# Patient Record
Sex: MALE | Race: WHITE | NOT HISPANIC OR LATINO | Employment: OTHER | URBAN - METROPOLITAN AREA
[De-identification: names, ages, dates, MRNs, and addresses within clinical notes are randomized per-mention and may not be internally consistent; named-entity substitution may affect disease eponyms.]

---

## 2022-08-11 ENCOUNTER — TELEPHONE (OUTPATIENT)
Dept: GASTROENTEROLOGY | Facility: CLINIC | Age: 72
End: 2022-08-11
Payer: MEDICARE

## 2022-08-11 NOTE — TELEPHONE ENCOUNTER
I spoke with the patient who states that he is not currently having any issues but he needs to schedule his E/C and needs to see Dr. Rizzo in clinic first. Requesting OV before December.  JÚNIOR EDGE

## 2022-08-11 NOTE — TELEPHONE ENCOUNTER
----- Message from Yvonne Siddiqi sent at 8/11/2022  1:55 PM CDT -----  Regarding: appt access  Contact: Nurys Cavanaugh/rick  Type:  Sooner Apoointment Request    Caller is requesting a sooner appointment.  Caller declined first available appointment listed below.  Caller will not accept being placed on the waitlist and is requesting a message be sent to doctor.  Name of Caller: Nurys Cavanaugh/friend   When is the first available appointment? 12/27/22  Symptoms: 6 mo fu  Would the patient rather a call back or a response via [facility]?  Call back   Best Call Back Number: [phone]  Additional Information:  Pt will not be in the country in December and is wanting to be seen sooner

## 2022-08-13 NOTE — TELEPHONE ENCOUNTER
He does need an office visit to update his chart and can be scheduled for his upper and lower endoscopies from there.  Schedule office visit with [facility], next available.  DIALLO

## 2022-11-08 ENCOUNTER — TELEPHONE (OUTPATIENT)
Dept: GASTROENTEROLOGY | Facility: CLINIC | Age: 72
End: 2022-11-08

## 2022-11-08 DIAGNOSIS — K74.60 HEPATIC CIRRHOSIS, UNSPECIFIED HEPATIC CIRRHOSIS TYPE, UNSPECIFIED WHETHER ASCITES PRESENT: Primary | ICD-10-CM

## 2022-11-08 NOTE — TELEPHONE ENCOUNTER
Notify patient that I got his orthopedics doctor's request for clearance for his surgery. I can provide that after his 12/27/2022 OV with me (no OV this year so far). Order cirrhosis blood work and US for early part of 12/2022. So we have the results for that OV.  NBP

## 2022-11-09 NOTE — TELEPHONE ENCOUNTER
Pt states that he has been trying for quite some time now to be seen earlier than end of Dec. States he will be out of the country from Dec 15-Mid Jan. Please advise.

## 2022-11-16 PROBLEM — B18.2 CHRONIC HEPATITIS C WITHOUT HEPATIC COMA: Status: ACTIVE | Noted: 2022-11-16

## 2022-11-16 PROBLEM — K74.60 HEPATIC CIRRHOSIS: Status: ACTIVE | Noted: 2022-11-16

## 2022-11-20 ENCOUNTER — TELEPHONE (OUTPATIENT)
Dept: GASTROENTEROLOGY | Facility: CLINIC | Age: 72
End: 2022-11-20
Payer: MEDICARE

## 2022-11-20 NOTE — TELEPHONE ENCOUNTER
Abd US: cirrhotic liver, onl.   Notify patient that his US of the liver shows his cirrhosis changes but otherwise normal. Have his blood drawn before his next OV with me.  DIALLO

## 2023-01-25 ENCOUNTER — OFFICE VISIT (OUTPATIENT)
Dept: GASTROENTEROLOGY | Facility: CLINIC | Age: 73
End: 2023-01-25
Payer: MEDICARE

## 2023-01-25 VITALS
OXYGEN SATURATION: 95 % | DIASTOLIC BLOOD PRESSURE: 78 MMHG | HEART RATE: 77 BPM | HEIGHT: 72 IN | WEIGHT: 225.81 LBS | BODY MASS INDEX: 30.59 KG/M2 | SYSTOLIC BLOOD PRESSURE: 151 MMHG

## 2023-01-25 DIAGNOSIS — K74.60 HEPATIC CIRRHOSIS, UNSPECIFIED HEPATIC CIRRHOSIS TYPE, UNSPECIFIED WHETHER ASCITES PRESENT: Primary | ICD-10-CM

## 2023-01-25 DIAGNOSIS — Z86.19 HISTORY OF HEPATITIS C: ICD-10-CM

## 2023-01-25 DIAGNOSIS — Z86.010 HISTORY OF COLON POLYPS: ICD-10-CM

## 2023-01-25 DIAGNOSIS — Z87.11 HISTORY OF PEPTIC ULCER DISEASE: ICD-10-CM

## 2023-01-25 PROCEDURE — 99214 OFFICE O/P EST MOD 30 MIN: CPT | Mod: S$GLB,,, | Performed by: INTERNAL MEDICINE

## 2023-01-25 PROCEDURE — 99214 PR OFFICE/OUTPT VISIT, EST, LEVL IV, 30-39 MIN: ICD-10-PCS | Mod: S$GLB,,, | Performed by: INTERNAL MEDICINE

## 2023-01-25 RX ORDER — LOSARTAN POTASSIUM 50 MG/1
50 TABLET ORAL DAILY
COMMUNITY

## 2023-01-25 NOTE — PROGRESS NOTES
Clinic Note    Reason for visit:  The primary encounter diagnosis was Hepatic cirrhosis, unspecified hepatic cirrhosis type, unspecified whether ascites present. Diagnoses of History of hepatitis C, History of colon polyps, and History of peptic ulcer disease were also pertinent to this visit.    PCP: No primary care provider on file.       HPI:  This is a 73 y.o. male who is established. Patient with cirrhosis and a h/o Hep C s/p Mavyret x 12 weeks in 10/2018.      He is having left knee replacement next week.  His daughter moved to [place].  He spent 8 months of last year in [place].  This is his normal.  He will notify me when he is back in the country and for what period of time when he is aware that information.  He knows very well how much acetaminophen he is allowed to take daily particularly after his knee replacement.  He is aware to include the acetaminophen that is in his pain medication as well with this calculation.  He does not use alcohol.  Very infrequently he may have a unit or 2.  He had blood work for his preoperative evaluation.  He had his ultrasound that showed a nodular liver but no liver tumors.  I will give him blood work orders for when he gets blood work next.      No PHCP.    Abd US 11/2022: cirrhotic liver, onl.     EGD 5/6/2021: GBx nl w/o Hp    EGD/Colonoscopy 9/11/2019: 4 TA (one 10mm), repeat colon in 3y.     Abnormality of alphafetoprotein: CT without hepatomas  Admits alcohol use: stopped  Chronic hepatitis C: VL 1.4, genotype 1a, completed Mavyret x12 weeks 7/24/2018, SVR 12 achieved, 2/2020 HCV VL ND, Tx naive beforehand  Gastric ulcer: he will get it from [place], taking PRN based on Sx panto 20mg PO dailynbsp; 8/24/2017  Personal history of colon polyps: due 2022  Unspecified cirrhosis of liver: signs of coagulopathy/thrombocytopenia, nodular liver on US and CT, no liver tumors on CT, AFP mildly elevated, no edema/ascites, no EV on 8/2017 EGD, 7/2018 EGD with small EV --> 9/2019  w/o varices. Blood/US every 6 months. OV every year but he is to call me if any issues.  Screening for malignant neoplasms of colon: high risk  INR raised: minimal, due to cirrhosis    Review of Systems   Constitutional:  Negative for chills, diaphoresis, fatigue, fever and unexpected weight change.   HENT:  Negative for hearing loss, mouth sores, nosebleeds, postnasal drip, sore throat, trouble swallowing and voice change.    Eyes:  Negative for pain, discharge and eye dryness.   Respiratory:  Negative for apnea, cough, choking, chest tightness, shortness of breath and wheezing.    Cardiovascular:  Negative for chest pain, palpitations, leg swelling and claudication.   Gastrointestinal:  Negative for abdominal distention, abdominal pain, anal bleeding, blood in stool, change in bowel habit, constipation, diarrhea, nausea, rectal pain, vomiting, reflux, fecal incontinence and change in bowel habit.   Genitourinary:  Negative for bladder incontinence, difficulty urinating, dysuria, flank pain, frequency and hematuria.   Musculoskeletal:  Negative for arthralgias, back pain, joint swelling and joint deformity.   Integumentary:  Negative for color change, rash and wound.   Allergic/Immunologic: Negative for environmental allergies and food allergies.   Neurological:  Negative for seizures, facial asymmetry, speech difficulty, weakness, headaches and memory loss.   Hematological:  Negative for adenopathy. Does not bruise/bleed easily.   Psychiatric/Behavioral:  Negative for agitation, behavioral problems, confusion, hallucinations and sleep disturbance.       Past Medical History:   Diagnosis Date    Colon polyp     Essential (primary) hypertension     Unspecified cirrhosis of liver     Unspecified osteoarthritis, unspecified site      Past Surgical History:   Procedure Laterality Date    CATARACT EXTRACTION Bilateral     COLONOSCOPY       Family History   Problem Relation Age of Onset    Lung cancer Mother     Lung  cancer Father     Diabetes Maternal Grandmother      Social History     Tobacco Use    Smoking status: Former     Types: Cigarettes    Smokeless tobacco: Never   Substance Use Topics    Alcohol use: Not Currently    Drug use: Never     Review of patient's allergies indicates:  No Known Allergies     Medication List with Changes/Refills   Current Medications    LOSARTAN (COZAAR) 50 MG TABLET    Take 50 mg by mouth once daily.          Vital Signs:  BP (!) 151/78   Pulse 77   Ht 6' (1.829 m)   Wt 102.4 kg (225 lb 12.8 oz)   SpO2 95%   BMI 30.62 kg/m²         Physical Exam  Constitutional:       General: He is not in acute distress.     Appearance: Normal appearance. He is well-developed. He is not ill-appearing or toxic-appearing.   HENT:      Head: Normocephalic and atraumatic.      Nose: Nose normal.      Mouth/Throat:      Mouth: Mucous membranes are moist.      Pharynx: Oropharynx is clear. No oropharyngeal exudate or posterior oropharyngeal erythema.   Eyes:      General: Lids are normal. No scleral icterus.        Right eye: No discharge.         Left eye: No discharge.      Extraocular Movements: Extraocular movements intact.      Conjunctiva/sclera: Conjunctivae normal.   Cardiovascular:      Rate and Rhythm: Normal rate and regular rhythm.      Pulses:           Radial pulses are 2+ on the right side and 2+ on the left side.   Pulmonary:      Effort: Pulmonary effort is normal. No respiratory distress.      Breath sounds: No stridor. No wheezing or rhonchi.   Abdominal:      General: Bowel sounds are normal. There is no distension.      Palpations: Abdomen is soft. There is no fluid wave, hepatomegaly, splenomegaly or mass.      Tenderness: There is no abdominal tenderness. There is no guarding or rebound.   Musculoskeletal:      Cervical back: Full passive range of motion without pain.      Right lower leg: No edema.      Left lower leg: No edema.   Lymphadenopathy:      Cervical: No cervical  adenopathy.   Skin:     General: Skin is warm and dry.      Capillary Refill: Capillary refill takes less than 2 seconds.      Coloration: Skin is not cyanotic, jaundiced or pale.      Findings: No rash.   Neurological:      General: No focal deficit present.      Mental Status: He is alert and oriented to person, place, and time.   Psychiatric:         Mood and Affect: Mood normal.         Behavior: Behavior is cooperative.          All of the data above and below has been reviewed by myself and any further interpretations will be reflected in the assessment and plan.   The data includes review of external notes, and independent interpretation of lab results, procedures, x-rays, and imaging reports.      Assessment:  Hepatic cirrhosis, unspecified hepatic cirrhosis type, unspecified whether ascites present    History of hepatitis C    History of colon polyps    History of peptic ulcer disease    We will plan on scheduling his repeat colonoscopy at his next office visit.  He will notify me when he is returning back to the country and for how long or when he is leaving the country.  If he does not leave until November 2023 feel we can complete his follow-up visit and his colonoscopy before he leaves.  He will notify me sooner if any issues.     Recommendations:  Complete my blood work orders non urgently.  Notify me sooner if any issues.  Limit all acetaminophen intake to 2000 mg or less spread out throughout each day.    Risks, benefits, and alternatives of medical management, any associated procedures, and/or treatment discussed with the patient. Patient given opportunity to ask questions and voices understanding. Patient has elected to proceed with the recommended care modalities as discussed.    Follow up in about 6 months (around 7/25/2023).    Order summary:  No orders of the defined types were placed in this encounter.       Instructed patient to notify my office if they have not been contacted within two  weeks after any procedures, submitting any samples (biopsies, blood, stool, urine, etc.) or after any imaging (X-ray, CT, MRI, etc.).     Charisse Rizzo MD    This document may have been created using a voice recognition transcribing system. Incorrect words or phrases may have been missed during proofreading. Please interpret accordingly or contact me for clarification.

## 2023-01-25 NOTE — LETTER
January 25, 2023        Joo Wood MD  [address]  [address]  [facility]  [place] [address]             [facility]  [address] [place] [address]  Phone: [phone]  Fax: [phone]   Patient: Maciej Spence   MR Number: [id number]   YOB: 1950   Date of Visit: 1/25/2023       Dear Dr. Wood:    Thank you for referring Maciej Spence to me for evaluation. Attached you will find relevant portions of my assessment and plan of care.    If you have questions, please do not hesitate to call me. I look forward to following Maciej Spence along with you.    Sincerely,      Charisse Rizzo MD            CC  No Recipients    Enclosure

## 2023-01-25 NOTE — PATIENT INSTRUCTIONS
Complete my blood work orders non urgently.  Notify me sooner if any issues.  Limit all acetaminophen intake to 2000 mg or less spread out throughout each day.

## 2023-01-26 DIAGNOSIS — K74.60 HEPATIC CIRRHOSIS, UNSPECIFIED HEPATIC CIRRHOSIS TYPE, UNSPECIFIED WHETHER ASCITES PRESENT: ICD-10-CM

## 2023-01-26 DIAGNOSIS — Z86.010 HISTORY OF COLON POLYPS: Primary | ICD-10-CM

## 2023-01-26 DIAGNOSIS — Z86.19 HISTORY OF HEPATITIS C: ICD-10-CM

## 2023-01-26 LAB
ABS NRBC COUNT: 0 X 10 3/UL (ref 0–0.01)
ABSOLUTE BASOPHIL: 0.03 X 10 3/UL (ref 0–0.22)
ABSOLUTE EOSINOPHIL: 0.12 X 10 3/UL (ref 0.04–0.54)
ABSOLUTE IMMATURE GRAN: 0.02 X 10 3/UL (ref 0–0.04)
ABSOLUTE LYMPHOCYTE: 1.58 X 10 3/UL (ref 0.86–4.75)
ABSOLUTE MONOCYTE: 0.43 X 10 3/UL (ref 0.22–1.08)
ALBUMIN SERPL-MCNC: 4.6 G/DL (ref 3.5–5.2)
ALBUMIN/GLOB SERPL ELPH: 1.6 {RATIO} (ref 1–2.7)
ALP ISOS SERPL LEV INH-CCNC: 60 U/L (ref 40–130)
ALPHA FETOPROTEIN MATERNAL: 3.7 NG/ML (ref 0–8.3)
ALT (SGPT): 19 U/L (ref 0–41)
ANION GAP SERPL CALC-SCNC: 10 MMOL/L (ref 8–17)
AST SERPL-CCNC: 20 U/L (ref 0–40)
BASOPHILS NFR BLD: 0.6 % (ref 0.2–1.2)
BILIRUBIN, TOTAL: 0.75 MG/DL (ref 0–1.2)
BUN/CREAT SERPL: 24.2 (ref 6–20)
CALCIUM SERPL-MCNC: 10.1 MG/DL (ref 8.6–10.2)
CARBON DIOXIDE, CO2: 25 MMOL/L (ref 22–29)
CHLORIDE: 103 MMOL/L (ref 98–107)
CREAT SERPL-MCNC: 0.76 MG/DL (ref 0.7–1.2)
EOSINOPHIL NFR BLD: 2.5 % (ref 0.7–7)
GFR ESTIMATION: 95.5
GLOBULIN: 2.9 G/DL (ref 1.5–4.5)
GLUCOSE: 103 MG/DL (ref 82–115)
HCT VFR BLD AUTO: 41.6 % (ref 42–52)
HGB BLD-MCNC: 14.3 G/DL (ref 14–18)
IMMATURE GRANULOCYTES: 0.4 % (ref 0–0.5)
INR PPP: 0.97 (ref 0.88–1.12)
LYMPHOCYTES NFR BLD: 32.3 % (ref 19.3–53.1)
MCH RBC QN AUTO: 30.8 PG (ref 27–32)
MCHC RBC AUTO-ENTMCNC: 34.4 G/DL (ref 32–36)
MCV RBC AUTO: 89.7 FL (ref 80–94)
MONOCYTES NFR BLD: 8.8 % (ref 4.7–12.5)
NEUTROPHILS # BLD AUTO: 2.71 X 10 3/UL (ref 2.15–7.56)
NEUTROPHILS NFR BLD: 55.4 % (ref 34–71.1)
NUCLEATED RED BLOOD CELLS: 0 /100 WBC (ref 0–0.2)
PLATELET # BLD AUTO: 194 X 10 3/UL (ref 135–400)
POTASSIUM: 4.8 MMOL/L (ref 3.5–5.1)
PROT SNV-MCNC: 7.5 G/DL (ref 6.4–8.3)
PROTIME: 10.4 SECONDS (ref 9.5–11.9)
RBC # BLD AUTO: 4.64 X 10 6/UL (ref 4.7–6.1)
RDW-SD: 42.6 FL (ref 37–54)
SODIUM: 138 MMOL/L (ref 136–145)
UREA NITROGEN (BUN): 18.4 MG/DL (ref 8–23)
WBC # BLD: 4.89 X 10 3/UL (ref 4.3–10.8)

## 2023-01-26 NOTE — TELEPHONE ENCOUNTER
I was under the impression that the patient already received surgical clearance by provider that orthopedic set him up with.  I contacted Bri.  She explained that any time the patient is seeing a specialist request clearance from each specialist as well.  She confirm that the patient had a CBC, CMP, PT, PTT and vitamin-D.  She will fax those results to me.  [phone] was a number provided.  Send those to me in nicko urgent for me to compose a clearance letter for the patient.  DIALLO

## 2023-01-26 NOTE — TELEPHONE ENCOUNTER
----- Message from Shelly Valenzuela LPN sent at 1/26/2023  1:00 PM CST -----  Do you provide surgical clearance?    ----- Message -----  From: Lisa Matthews  Sent: 1/26/2023   8:56 AM CST  To: [facility] Staff    Type:  Needs Medical Advice    Who Called:Bri red/Dr Wade office  Symptoms (please be specific): -   How long has patient had these symptoms:  -  Pharmacy name and phone #:  -  Would the patient rather a call back or a response via [facility]?    Best Call Back Number: [phone]  Additional Information: Need surgery clearance ( pt is scheduled for procedure on 1/31/23) please fax to [phone] Attn: Bri

## 2023-01-27 NOTE — TELEPHONE ENCOUNTER
Letter composed for GI surgical clearance. Send letter with last OV note, last labs and last US report.  Notify patient that his blood work from yesterday looked well. Notify him that you have send the above to Dr. Brennan's office. He will need repeat cirrhosis labs and liver US in 6 months.  DIALLO

## 2023-07-03 VITALS — BODY MASS INDEX: 29.12 KG/M2 | HEIGHT: 72 IN | WEIGHT: 215 LBS

## 2023-07-03 NOTE — TELEPHONE ENCOUNTER
I contacted pt to notify him that it its time for labs and U/S. Pt requested that I direct schedule him for his colonoscopy that was past due. He stated that he had left total knee replacement done 6 months ago and is now ready to proceed with procedure. Will be out of country for work beginning in November and he requested a date in August.  Chart updated and pt scheduled for 8/24/23.

## 2023-07-06 RX ORDER — SOD SULF/POT CHLORIDE/MAG SULF 1.479 G
12 TABLET ORAL DAILY
Qty: 24 TABLET | Refills: 0 | Status: SHIPPED | OUTPATIENT
Start: 2023-07-06 | End: 2024-03-19

## 2023-07-10 ENCOUNTER — TELEPHONE (OUTPATIENT)
Dept: GASTROENTEROLOGY | Facility: CLINIC | Age: 73
End: 2023-07-10
Payer: MEDICARE

## 2023-07-10 LAB
ABS NRBC COUNT: 0 X 10 3/UL (ref 0–0.01)
ABSOLUTE BASOPHIL: 0.02 X 10 3/UL (ref 0–0.22)
ABSOLUTE EOSINOPHIL: 0.14 X 10 3/UL (ref 0.04–0.54)
ABSOLUTE IMMATURE GRAN: 0.02 X 10 3/UL (ref 0–0.04)
ABSOLUTE LYMPHOCYTE: 1.68 X 10 3/UL (ref 0.86–4.75)
ABSOLUTE MONOCYTE: 0.45 X 10 3/UL (ref 0.22–1.08)
ALBUMIN SERPL-MCNC: 4.8 G/DL (ref 3.5–5.2)
ALBUMIN/GLOB SERPL ELPH: 1.7 {RATIO} (ref 1–2.7)
ALP ISOS SERPL LEV INH-CCNC: 68 U/L (ref 40–130)
ALPHA FETOPROTEIN MATERNAL: 2.93 NG/ML (ref 0–8.3)
ALT (SGPT): 16 U/L (ref 0–41)
ANION GAP SERPL CALC-SCNC: 14 MMOL/L (ref 8–17)
AST SERPL-CCNC: 20 U/L (ref 0–40)
BASOPHILS NFR BLD: 0.4 % (ref 0.2–1.2)
BILIRUBIN, TOTAL: 0.64 MG/DL (ref 0–1.2)
BUN/CREAT SERPL: 23.9 (ref 6–20)
CALCIUM SERPL-MCNC: 10.1 MG/DL (ref 8.6–10.2)
CARBON DIOXIDE, CO2: 25 MMOL/L (ref 22–29)
CHLORIDE: 106 MMOL/L (ref 98–107)
CREAT SERPL-MCNC: 0.74 MG/DL (ref 0.7–1.2)
EOSINOPHIL NFR BLD: 2.5 % (ref 0.7–7)
GFR ESTIMATION: 96.27 ML/MIN/1.73M2
GLOBULIN: 2.9 G/DL (ref 1.5–4.5)
GLUCOSE: 111 MG/DL (ref 82–115)
HCT VFR BLD AUTO: 43.4 % (ref 42–52)
HGB BLD-MCNC: 14.6 G/DL (ref 14–18)
IMMATURE GRANULOCYTES: 0.4 % (ref 0–0.5)
INR PPP: 0.97 (ref 0.88–1.12)
LYMPHOCYTES NFR BLD: 30.2 % (ref 19.3–53.1)
MCH RBC QN AUTO: 30.4 PG (ref 27–32)
MCHC RBC AUTO-ENTMCNC: 33.6 G/DL (ref 32–36)
MCV RBC AUTO: 90.2 FL (ref 80–94)
MONOCYTES NFR BLD: 8.1 % (ref 4.7–12.5)
NEUTROPHILS # BLD AUTO: 3.26 X 10 3/UL (ref 2.15–7.56)
NEUTROPHILS NFR BLD: 58.4 % (ref 34–71.1)
NUCLEATED RED BLOOD CELLS: 0 /100 WBC (ref 0–0.2)
PLATELET # BLD AUTO: 215 X 10 3/UL (ref 135–400)
POTASSIUM: 5.2 MMOL/L (ref 3.5–5.1)
PROT SNV-MCNC: 7.7 G/DL (ref 6.4–8.3)
PROTIME: 10.1 SECONDS (ref 9.3–11.5)
RBC # BLD AUTO: 4.81 X 10 6/UL (ref 4.7–6.1)
RDW-SD: 42.7 FL (ref 37–54)
SODIUM: 145 MMOL/L (ref 136–145)
UREA NITROGEN (BUN): 17.7 MG/DL (ref 8–23)
WBC # BLD: 5.57 X 10 3/UL (ref 4.3–10.8)

## 2023-07-31 ENCOUNTER — TELEPHONE (OUTPATIENT)
Dept: GASTROENTEROLOGY | Facility: CLINIC | Age: 73
End: 2023-07-31
Payer: MEDICARE

## 2023-07-31 NOTE — TELEPHONE ENCOUNTER
----- Message from Leonor Mcpherson sent at 7/31/2023  9:50 AM CDT -----  Contact: self  Pt is calling about an appt on tmrw. But there is no record of that on his chart. Pt has received a text for liver scan possibly.  Please call pt asap at [phone]

## 2023-08-18 ENCOUNTER — TELEPHONE (OUTPATIENT)
Dept: GASTROENTEROLOGY | Facility: CLINIC | Age: 73
End: 2023-08-18
Payer: MEDICARE

## 2023-08-18 VITALS — BODY MASS INDEX: 29.12 KG/M2 | WEIGHT: 215 LBS | HEIGHT: 72 IN

## 2023-08-18 DIAGNOSIS — Z86.010 HISTORY OF COLON POLYPS: Primary | ICD-10-CM

## 2023-08-18 NOTE — TELEPHONE ENCOUNTER
"[facility]  [address] [place] [address]  Phone: [phone]  Fax: [phone]    History & Physical         Provider: Dr. Charisse Rizzo    Patient Name: Maciej Spence DOB (age):1950  73 y.o.           Gender: male   Phone: [phone]     Referring Physician: No primary care provider on file.     Vital Signs:   Height - 6' 0"  Weight - 215 lb  BMI -  29.16    Plan: Colonoscopy @ [facility]    Encounter Diagnosis   Name Primary?    History of colon polyps Yes           History:      Past Medical History:   Diagnosis Date    BMI 29.0-29.9,adult     Colon polyp     Essential (primary) hypertension     History of hepatitis C 2018    Unspecified cirrhosis of liver     Unspecified osteoarthritis, unspecified site       Past Surgical History:   Procedure Laterality Date    CATARACT EXTRACTION Bilateral     COLONOSCOPY      TOTAL KNEE ARTHROPLASTY Left       Medication List with Changes/Refills   Current Medications    LOSARTAN (COZAAR) 50 MG TABLET    Take 50 mg by mouth once daily.    SOD SULF-POT CHLORIDE-MAG SULF (SUTAB) 1.479-0.188- 0.225 GRAM TABLET    Take 12 tablets by mouth once daily. Take according to package instructions with indicated amount of water. No breakfast day before test. May substitute with Suprep, Clenpiq, Plenvu, Moviprep or GoLytely based on Rx plan and patient preference.      Review of patient's allergies indicates:  No Known Allergies   Family History   Problem Relation Age of Onset    Lung cancer Mother     Lung cancer Father     Diabetes Maternal Grandmother       Social History     Tobacco Use    Smoking status: Former     Current packs/day: 0.00     Types: Cigarettes    Smokeless tobacco: Never   Substance Use Topics    Alcohol use: Not Currently    Drug use: Yes     Types: Marijuana        Physical Examination:     General Appearance:___________________________  HEENT: " _____________________________________  Abdomen:____________________________________  Heart:________________________________________  Lungs:_______________________________________  Extremities:___________________________________  Skin:_________________________________________  Endocrine:____________________________________  Genitourinary:_________________________________  Neurological:__________________________________      Patient has been evaluated immediately prior to sedation and is medically cleared for endoscopy with IVCS as an ASA class: ______      Physician Signature: _________________________       Date: ________  Time: ________

## 2023-08-24 ENCOUNTER — OUTSIDE PLACE OF SERVICE (OUTPATIENT)
Dept: GASTROENTEROLOGY | Facility: CLINIC | Age: 73
End: 2023-08-24
Payer: MEDICARE

## 2023-08-24 LAB — CRC RECOMMENDATION EXT: NORMAL

## 2023-08-24 PROCEDURE — 45385 COLONOSCOPY W/LESION REMOVAL: CPT | Mod: PT,,, | Performed by: INTERNAL MEDICINE

## 2023-08-24 PROCEDURE — 45385 PR COLONOSCOPY,REMV LESN,SNARE: ICD-10-PCS | Mod: PT,,, | Performed by: INTERNAL MEDICINE

## 2023-08-24 PROCEDURE — 45380 COLONOSCOPY AND BIOPSY: CPT | Mod: PT,59,, | Performed by: INTERNAL MEDICINE

## 2023-08-24 PROCEDURE — 45380 PR COLONOSCOPY,BIOPSY: ICD-10-PCS | Mod: PT,59,, | Performed by: INTERNAL MEDICINE

## 2023-08-29 ENCOUNTER — TELEPHONE (OUTPATIENT)
Dept: GASTROENTEROLOGY | Facility: CLINIC | Age: 73
End: 2023-08-29

## 2023-08-29 NOTE — TELEPHONE ENCOUNTER
5 TA, repeat colonoscopy with adult colonoscope in 3 years.     Notify patient. Update in Health Maintenance section of Epic.  NBP

## 2023-08-29 NOTE — TELEPHONE ENCOUNTER
Pt given results and verbalized understanding.  Pt recently had US and results notes said it would be discussed at upcoming OV. Pt had OV for 8/29/23 that he rescheduled. Next OV isnt until March 2024.

## 2023-08-30 NOTE — TELEPHONE ENCOUNTER
Notify patient that there were no liver tumors on his liver US. Repeat cirrhosis blood work and US before next OV.  NBP

## 2023-10-02 ENCOUNTER — DOCUMENTATION ONLY (OUTPATIENT)
Dept: GASTROENTEROLOGY | Facility: CLINIC | Age: 73
End: 2023-10-02
Payer: MEDICARE

## 2023-12-20 ENCOUNTER — TELEPHONE (OUTPATIENT)
Dept: GASTROENTEROLOGY | Facility: CLINIC | Age: 73
End: 2023-12-20
Payer: MEDICARE

## 2024-03-19 ENCOUNTER — OFFICE VISIT (OUTPATIENT)
Dept: GASTROENTEROLOGY | Facility: CLINIC | Age: 74
End: 2024-03-19
Payer: MEDICARE

## 2024-03-19 VITALS
HEART RATE: 77 BPM | DIASTOLIC BLOOD PRESSURE: 89 MMHG | BODY MASS INDEX: 28.76 KG/M2 | SYSTOLIC BLOOD PRESSURE: 148 MMHG | HEIGHT: 72 IN | OXYGEN SATURATION: 99 % | WEIGHT: 212.38 LBS

## 2024-03-19 DIAGNOSIS — Z86.19 HISTORY OF HEPATITIS C: ICD-10-CM

## 2024-03-19 DIAGNOSIS — Z87.11 HISTORY OF PEPTIC ULCER DISEASE: ICD-10-CM

## 2024-03-19 DIAGNOSIS — Z87.19 HISTORY OF ESOPHAGEAL VARICES: ICD-10-CM

## 2024-03-19 DIAGNOSIS — Z86.010 HISTORY OF COLON POLYPS: ICD-10-CM

## 2024-03-19 DIAGNOSIS — K74.60 HEPATIC CIRRHOSIS, UNSPECIFIED HEPATIC CIRRHOSIS TYPE, UNSPECIFIED WHETHER ASCITES PRESENT: Primary | ICD-10-CM

## 2024-03-19 PROCEDURE — 99215 OFFICE O/P EST HI 40 MIN: CPT | Mod: S$GLB,,, | Performed by: INTERNAL MEDICINE

## 2024-03-19 NOTE — PROGRESS NOTES
Clinic Note    Reason for visit:  The primary encounter diagnosis was Hepatic cirrhosis, unspecified hepatic cirrhosis type, unspecified whether ascites present. Diagnoses of History of esophageal varices, History of hepatitis C, History of peptic ulcer disease, and History of colon polyps were also pertinent to this visit.    PCP: Rosalinda, Primary Doctor       HPI:  This is a 74 y.o. male who is here for a follow up. Patient with cirrhosis. Patient denies any reflux, dysphagia, abdominal pain, constipation, diarrhea, or blood in stool. Denies ETOH use. May drink 1 glass of wine at [place].    7/2023: K5.2, CBC/AFP/CMP/INR nl.     8/2023 Abd US: coarse liver, nodular, mild GB wall thickening, perhaps mild sludge in GB, no hepatomas, PV patent.     Colonoscopy 8/24/2023: Long redundant colon. 5 TA, repeat colonoscopy with adult colonoscope in 3 years.   EGD 5/6/2021: GBx nl w/o Hp     Abnormality of alphafetoprotein: CT without hepatomas  Admits alcohol use: stopped  Chronic hepatitis C: VL 1.4, genotype 1a, completed Mavyret x12 weeks 7/24/2018, SVR 12 achieved, 2/2020 HCV VL ND, Tx naive beforehand  Gastric ulcer: he will get it from [place], taking PRN based on Sx panto 20mg PO daily  Unspecified cirrhosis of liver: signs of coagulopathy/thrombocytopenia, nodular liver on US and CT, no liver tumors on CT, AFP mildly elevated, no edema/ascites, no EV on 8/2017 EGD, 7/2018 EGD with small EV --> 9/2019 w/o varices. Blood/US every 6 months. OV every year but he is to call me if any issues.  Screening for malignant neoplasms of colon: high risk  INR raised: minimal, due to cirrhosis    Review of Systems   Constitutional:  Negative for diaphoresis, fatigue, fever and unexpected weight change.   HENT:  Negative for hearing loss, mouth sores, postnasal drip, sore throat and trouble swallowing.    Eyes:  Negative for pain, discharge and eye dryness.   Respiratory:  Negative for apnea, cough, choking, chest tightness, shortness  of breath and wheezing.    Cardiovascular:  Negative for chest pain, palpitations and leg swelling.   Gastrointestinal:  Negative for abdominal distention, abdominal pain, anal bleeding, blood in stool, change in bowel habit, constipation, diarrhea, nausea, rectal pain, vomiting, reflux and fecal incontinence.   Genitourinary:  Negative for bladder incontinence, dysuria and hematuria.   Musculoskeletal:  Negative for arthralgias, back pain and joint swelling.   Integumentary:  Negative for color change and rash.   Allergic/Immunologic: Negative for environmental allergies and food allergies.   Neurological:  Negative for seizures and headaches.   Hematological:  Negative for adenopathy. Does not bruise/bleed easily.        Past Medical History:   Diagnosis Date    BMI 29.0-29.9,adult     Colon polyp     Essential (primary) hypertension     History of hepatitis C 2018    Unspecified cirrhosis of liver     Unspecified osteoarthritis, unspecified site      Past Surgical History:   Procedure Laterality Date    CATARACT EXTRACTION Bilateral     COLONOSCOPY      TOTAL KNEE ARTHROPLASTY Left      Family History   Problem Relation Age of Onset    Lung cancer Mother     Lung cancer Father     Diabetes Maternal Grandmother     Esophageal cancer Neg Hx     Inflammatory bowel disease Neg Hx     Irritable bowel syndrome Neg Hx     Rectal cancer Neg Hx     Stomach cancer Neg Hx     Ulcerative colitis Neg Hx      Social History     Tobacco Use    Smoking status: Former     Types: Cigarettes    Smokeless tobacco: Never   Substance Use Topics    Alcohol use: Yes     Comment: very rare    Drug use: Yes     Types: Marijuana     Review of patient's allergies indicates:  No Known Allergies     Medication List with Changes/Refills   Current Medications    LOSARTAN (COZAAR) 50 MG TABLET    Take 50 mg by mouth once daily.   Discontinued Medications    SOD SULF-POT CHLORIDE-MAG SULF (SUTAB) 1.479-0.188- 0.225 GRAM TABLET    Take 12 tablets  by mouth once daily. Take according to package instructions with indicated amount of water. No breakfast day before test. May substitute with Suprep, Clenpiq, Plenvu, Moviprep or GoLytely based on Rx plan and patient preference.         Vital Signs:  BP (!) 148/89 (BP Location: Left arm, Patient Position: Sitting)   Pulse 77   Ht 6' (1.829 m)   Wt 96.3 kg (212 lb 6.4 oz)   SpO2 99%   BMI 28.81 kg/m²         Physical Exam  Constitutional:       General: He is not in acute distress.     Appearance: Normal appearance. He is well-developed. He is not ill-appearing or toxic-appearing.   HENT:      Head: Normocephalic and atraumatic.      Nose: Nose normal.      Mouth/Throat:      Mouth: Mucous membranes are moist.      Pharynx: Oropharynx is clear. No oropharyngeal exudate or posterior oropharyngeal erythema.   Eyes:      General: Lids are normal. No scleral icterus.        Right eye: No discharge.         Left eye: No discharge.      Conjunctiva/sclera: Conjunctivae normal.   Cardiovascular:      Rate and Rhythm: Normal rate and regular rhythm.      Pulses:           Radial pulses are 2+ on the right side and 2+ on the left side.   Pulmonary:      Effort: Pulmonary effort is normal. No respiratory distress.      Breath sounds: No stridor. No wheezing.   Abdominal:      General: Bowel sounds are normal. There is no distension.      Palpations: Abdomen is soft. There is no fluid wave, hepatomegaly, splenomegaly or mass.      Tenderness: There is no abdominal tenderness. There is no guarding or rebound.   Musculoskeletal:      Cervical back: Full passive range of motion without pain.   Lymphadenopathy:      Cervical: No cervical adenopathy.   Skin:     General: Skin is warm and dry.      Capillary Refill: Capillary refill takes less than 2 seconds.      Coloration: Skin is not cyanotic, jaundiced or pale.   Neurological:      General: No focal deficit present.      Mental Status: He is alert and oriented to person,  place, and time.   Psychiatric:         Mood and Affect: Mood normal.         Behavior: Behavior is cooperative.            All of the data above and below has been reviewed by myself and any further interpretations will be reflected in the assessment and plan.   The data includes review of external notes, and independent interpretation of lab results, procedures, x-rays, and imaging reports.      Assessment:  Hepatic cirrhosis, unspecified hepatic cirrhosis type, unspecified whether ascites present  -     CBC Auto Differential; Future; Expected date: 03/19/2024  -     Comprehensive Metabolic Panel; Future; Expected date: 03/19/2024  -     Protime-INR; Future; Expected date: 03/19/2024  -     AFP Tumor Marker; Future; Expected date: 03/19/2024  -     US Abdomen Complete; Future; Expected date: 03/19/2024  -     Ambulatory Referral to External Surgery    History of esophageal varices  -     Ambulatory Referral to External Surgery    History of hepatitis C    History of peptic ulcer disease  -     Ambulatory Referral to External Surgery    History of colon polyps    Colon w/adult colonoscope due 8/2026.  Chronic hepatitis C: VL 1.4, genotype 1a, completed Mavyret x12 weeks 7/24/2018, SVR 12 achieved, 2/2020 HCV VL ND, Tx naive beforehand    Due for EGD -varices surveillance.     Recommendations:    Schedule upper endoscopy.  Schedule abdominal ultrasound.  Submit bloodwork.       Risks, benefits, and alternatives of medical management, any associated procedures, and/or treatment discussed with the patient. Patient given opportunity to ask questions and voices understanding. Patient has elected to proceed with the recommended care modalities as discussed.    Instructed patient to notify my office if they have not been contacted within two weeks after any procedures, submitting any samples (biopsies, blood, stool, urine, etc.) or after any imaging (X-ray, CT, MRI, etc.).     Follow up in about 1 year (around 3/19/2025).  W/NBP, 6m w/TAMIR.    Order summary:  Orders Placed This Encounter   Procedures    US Abdomen Complete    CBC Auto Differential    Comprehensive Metabolic Panel    Protime-INR    AFP Tumor Marker    Ambulatory Referral to External Surgery      This assessment, plan, and documentation was performed in collaboration with Catarina Díaz NP.     This document may have been created using a voice recognition transcribing system. Incorrect words or phrases may have been missed during proofreading. Please interpret accordingly or contact me for clarification.     Charisse Rizzo MD

## 2024-03-19 NOTE — PATIENT INSTRUCTIONS
Schedule upper endoscopy.  Schedule abdominal ultrasound.  Submit bloodwork.     Please notify my office if you have not been contacted within two weeks after any procedures, submitting any samples (biopsies, blood, stool, urine, etc.) or after any imaging (X-ray, CT, MRI, etc.).

## 2024-03-26 LAB
ABS NRBC COUNT: 0 X 10 3/UL (ref 0–0.01)
ABSOLUTE BASOPHIL: 0.03 X 10 3/UL (ref 0–0.22)
ABSOLUTE EOSINOPHIL: 0.13 X 10 3/UL (ref 0.04–0.54)
ABSOLUTE IMMATURE GRAN: 0.01 X 10 3/UL (ref 0–0.04)
ABSOLUTE LYMPHOCYTE: 1.98 X 10 3/UL (ref 0.86–4.75)
ABSOLUTE MONOCYTE: 0.5 X 10 3/UL (ref 0.22–1.08)
ALBUMIN SERPL-MCNC: 4.4 G/DL (ref 3.5–5.2)
ALBUMIN/GLOB SERPL ELPH: 1.4 {RATIO} (ref 1–2.7)
ALP ISOS SERPL LEV INH-CCNC: 69 U/L (ref 40–130)
ALPHA FETOPROTEIN MATERNAL: 3.14 NG/ML (ref 0–8.3)
ALT (SGPT): 20 U/L (ref 0–41)
ANION GAP SERPL CALC-SCNC: 10 MMOL/L (ref 8–17)
AST SERPL-CCNC: 20 U/L (ref 0–40)
BASOPHILS NFR BLD: 0.5 % (ref 0.2–1.2)
BILIRUBIN, TOTAL: 0.64 MG/DL (ref 0–1.2)
BUN/CREAT SERPL: 26.4 (ref 6–20)
CALCIUM SERPL-MCNC: 9.9 MG/DL (ref 8.6–10.2)
CARBON DIOXIDE, CO2: 24 MMOL/L (ref 22–29)
CHLORIDE: 105 MMOL/L (ref 98–107)
CREAT SERPL-MCNC: 0.75 MG/DL (ref 0.7–1.2)
EOSINOPHIL NFR BLD: 2 % (ref 0.7–7)
GFR ESTIMATION: 95.29 ML/MIN/1.73M2
GLOBULIN: 3.1 G/DL (ref 1.5–4.5)
GLUCOSE: 100 MG/DL (ref 82–115)
HCT VFR BLD AUTO: 41.4 % (ref 42–52)
HGB BLD-MCNC: 13.7 G/DL (ref 14–18)
IMMATURE GRANULOCYTES: 0.2 % (ref 0–0.5)
INR PPP: 0.97 (ref 0.88–1.12)
LYMPHOCYTES NFR BLD: 31.2 % (ref 19.3–53.1)
MCH RBC QN AUTO: 30.1 PG (ref 27–32)
MCHC RBC AUTO-ENTMCNC: 33.1 G/DL (ref 32–36)
MCV RBC AUTO: 91 FL (ref 80–94)
MONOCYTES NFR BLD: 7.9 % (ref 4.7–12.5)
NEUTROPHILS # BLD AUTO: 3.7 X 10 3/UL (ref 2.15–7.56)
NEUTROPHILS NFR BLD: 58.2 % (ref 34–71.1)
NUCLEATED RED BLOOD CELLS: 0 /100 WBC (ref 0–0.2)
PLATELET # BLD AUTO: 199 X 10 3/UL (ref 135–400)
POTASSIUM: 5.2 MMOL/L (ref 3.5–5.1)
PROT SNV-MCNC: 7.5 G/DL (ref 6.4–8.3)
PROTIME: 10.1 SECONDS (ref 9.3–11.5)
RBC # BLD AUTO: 4.55 X 10 6/UL (ref 4.7–6.1)
RDW-SD: 41.9 FL (ref 37–54)
SODIUM: 139 MMOL/L (ref 136–145)
UREA NITROGEN (BUN): 19.8 MG/DL (ref 8–23)
WBC # BLD: 6.35 X 10 3/UL (ref 4.3–10.8)

## 2024-03-27 ENCOUNTER — TELEPHONE (OUTPATIENT)
Dept: GASTROENTEROLOGY | Facility: CLINIC | Age: 74
End: 2024-03-27
Payer: MEDICARE

## 2024-03-27 NOTE — TELEPHONE ENCOUNTER
Patient notified of results and voiced understanding. He said he haven't been scheduled for the liver US yet but he will let us know when he gets it done. DMP

## 2024-03-27 NOTE — TELEPHONE ENCOUNTER
Hb 13.7L, MCV 91, CBC onl. K 5.2H, CMP onl. INR/AFP nl.    Notify patient that his blood work overall looks well. He is mildly anemic and we will monitor that for now. Awaiting liver US report.  NBP

## 2024-04-03 NOTE — TELEPHONE ENCOUNTER
See notes from schedulers under Media tab. Seems they are unable to connect with patient to get US scheduled. Provider scheduling's number to patient and ask him to call to schedule.  NBP

## 2024-04-09 ENCOUNTER — TELEPHONE (OUTPATIENT)
Dept: GASTROENTEROLOGY | Facility: CLINIC | Age: 74
End: 2024-04-09
Payer: MEDICARE

## 2024-04-09 NOTE — TELEPHONE ENCOUNTER
Called and spoke with patient , he stated that he has been missing calls from us , so I told him that we were trying to get in touch with him to let him know about the US they wanted him to have done and patient stated that he goes today to have him it done ..  [facility]

## 2024-04-09 NOTE — TELEPHONE ENCOUNTER
----- Message from Karissa Magallon sent at 4/9/2024 10:11 AM CDT -----  Patient returning call

## 2024-04-11 ENCOUNTER — TELEPHONE (OUTPATIENT)
Dept: GASTROENTEROLOGY | Facility: CLINIC | Age: 74
End: 2024-04-11
Payer: MEDICARE

## 2024-04-11 NOTE — TELEPHONE ENCOUNTER
----- Message from Erika Serrano sent at 4/11/2024 11:06 AM CDT -----  Contact: mayela Wing is calling regarding a missed call.  Please give him a call back at [phone]

## 2024-04-12 NOTE — TELEPHONE ENCOUNTER
US reviewed. You may let him know overally his US looked well. Did show signs of cirrhosis similar to previous US. No lesions/tumors. He did have mild thickening of his GB wall which is common with cirrhosis patients and benign finding. There were no stones/sludge seen.   KN

## 2024-04-16 ENCOUNTER — TELEPHONE (OUTPATIENT)
Dept: GASTROENTEROLOGY | Facility: CLINIC | Age: 74
End: 2024-04-16

## 2024-04-16 NOTE — TELEPHONE ENCOUNTER
Abd US: nodular heterog liver, no masses, PV patent, GB wall 6mm, onl.    See notes in 3/27/2024 telephone encounter (not clear if patient notified).  NBP

## 2024-04-18 NOTE — TELEPHONE ENCOUNTER
Called and spoke with patient and went over his results he understood and I told him to call us with any questions or concerns .  Nick

## 2024-07-23 ENCOUNTER — TELEPHONE (OUTPATIENT)
Dept: GASTROENTEROLOGY | Facility: CLINIC | Age: 74
End: 2024-07-23
Payer: MEDICARE

## 2024-07-23 VITALS — WEIGHT: 212 LBS | BODY MASS INDEX: 28.71 KG/M2 | HEIGHT: 72 IN

## 2024-07-23 DIAGNOSIS — K74.60 HEPATIC CIRRHOSIS, UNSPECIFIED HEPATIC CIRRHOSIS TYPE, UNSPECIFIED WHETHER ASCITES PRESENT: Primary | ICD-10-CM

## 2024-07-23 DIAGNOSIS — Z87.19 HISTORY OF ESOPHAGEAL VARICES: ICD-10-CM

## 2024-07-23 DIAGNOSIS — Z86.19 HISTORY OF HEPATITIS C: ICD-10-CM

## 2024-07-23 NOTE — TELEPHONE ENCOUNTER
S/w pt and told his that I was calling as a courtesy regarding up coming EGD with NBP on 7/30/24, Tuesday and wanted to verify that he has his paper prep instructions. Pt stated he has the instructions.  I also mentioned that [facility] will call the day before (MON) with the arrival time, GI Lab is located on the third floor, and to pre-register any time this week. torres

## 2024-07-23 NOTE — TELEPHONE ENCOUNTER
"[facility]  [address] [place] [address]  Phone: [phone]  Fax: [phone]    History & Physical         Provider: Dr. Charisse Rizzo    Patient Name: Maciej Spence DOB (age):1950  74 y.o.           Gender: male   Phone: [phone]     Referring Physician: No, Primary Doctor     Vital Signs:   Height - 6' 0"  Weight - 212 lb   BMI -  28.75    Plan: EGD @ [facility]    Encounter Diagnoses   Name Primary?    Hepatic cirrhosis, unspecified hepatic cirrhosis type, unspecified whether ascites present Yes    History of hepatitis C     History of esophageal varices            History:      Past Medical History:   Diagnosis Date    BMI 29.0-29.9,adult     Colon polyp     Essential (primary) hypertension     History of hepatitis C 2018    Unspecified cirrhosis of liver     Unspecified osteoarthritis, unspecified site       Past Surgical History:   Procedure Laterality Date    CATARACT EXTRACTION Bilateral     COLONOSCOPY      TOTAL KNEE ARTHROPLASTY Left       Medication List with Changes/Refills   Current Medications    LOSARTAN (COZAAR) 50 MG TABLET    Take 50 mg by mouth once daily.      Review of patient's allergies indicates:  No Known Allergies   Family History   Problem Relation Name Age of Onset    Lung cancer Mother      Lung cancer Father      Diabetes Maternal Grandmother      Esophageal cancer Neg Hx      Inflammatory bowel disease Neg Hx      Irritable bowel syndrome Neg Hx      Rectal cancer Neg Hx      Stomach cancer Neg Hx      Ulcerative colitis Neg Hx        Social History     Tobacco Use    Smoking status: Former     Types: Cigarettes    Smokeless tobacco: Never   Substance Use Topics    Alcohol use: Yes     Comment: very rare    Drug use: Yes     Types: Marijuana        Physical Examination:     General Appearance:___________________________  HEENT: " _____________________________________  Abdomen:____________________________________  Heart:________________________________________  Lungs:_______________________________________  Extremities:___________________________________  Skin:_________________________________________  Endocrine:____________________________________  Genitourinary:_________________________________  Neurological:__________________________________      Patient has been evaluated immediately prior to sedation and is medically cleared for endoscopy with IVCS as an ASA class: ______      Physician Signature: _________________________       Date: ________  Time: ________

## 2024-07-24 NOTE — TELEPHONE ENCOUNTER
Ana Moreira Staff  Caller: self (Today,  9:35 AM)  Patient is requesting a call back regarding rescheduling procedure on 7/30 . Please call back at [phone]      7/24/24 10:22 AM    Returned pt call. Pt needs to r/s procedure for 7/30/24. R/s to 1/8/24 - Wed. Told pt I would call him a week before the procedure to make sure he has everything he needs. torres

## 2024-09-17 NOTE — PROGRESS NOTES
Clinic Note    Reason for visit:  The primary encounter diagnosis was Hepatic cirrhosis, unspecified hepatic cirrhosis type, unspecified whether ascites present. Diagnoses of History of hepatitis C, History of esophageal varices, and History of colon polyps were also pertinent to this visit.    PCP: Rosalinda, Primary Doctor       HPI:  This is a 74 y.o. male here for follow up. Patient with cirrhosis. He is scheduled for EGD with Dr. Rizzo 1/8/2025. Patient denies any reflux, dysphagia, abdominal pain, constipation, diarrhea, or blood in stool.     3/26/2024 Hb 13.7L, MCV 91, CBC onl. K 5.2H, CMP onl. INR/AFP nl.     4/2024 Abd US: nodular heterog liver, no masses, PV patent, GB wall 6mm, onl.   8/2023 Abd US: coarse liver, nodular, mild GB wall thickening, perhaps mild sludge in GB, no hepatomas, PV patent.      Colonoscopy 8/24/2023: Long redundant colon. 5 TA, repeat colonoscopy with adult colonoscope in 3 years.   EGD 5/6/2021: GBx nl w/o Hp     No PCP     Abnormality of alphafetoprotein: CT without hepatomas  Admits alcohol use: stopped  Chronic hepatitis C: VL 1.4, genotype 1a, completed Mavyret x12 weeks 7/24/2018, SVR 12 achieved, 2/2020 HCV VL ND, Tx naive beforehand  Gastric ulcer: he will get it from [place], taking PRN based on Sx panto 20mg PO daily  Unspecified cirrhosis of liver: signs of coagulopathy/thrombocytopenia, nodular liver on US and CT, no liver tumors on CT, AFP mildly elevated, no edema/ascites, no EV on 8/2017 EGD, 7/2018 EGD with small EV --> 9/2019 w/o varices. Blood/US every 6 months. OV every year but he is to call me if any issues.  Screening for malignant neoplasms of colon: high risk  INR raised: minimal, due to cirrhosis    Review of Systems   Constitutional:  Negative for diaphoresis, fatigue, fever and unexpected weight change.   HENT:  Negative for hearing loss, mouth sores, postnasal drip, sore throat and trouble swallowing.    Eyes:  Positive for eye dryness. Negative for pain and  discharge.   Respiratory:  Negative for apnea, cough, choking, chest tightness, shortness of breath and wheezing.    Cardiovascular:  Negative for chest pain, palpitations and leg swelling.   Gastrointestinal:  Negative for abdominal distention, abdominal pain, anal bleeding, blood in stool, change in bowel habit, constipation, diarrhea, nausea, rectal pain, vomiting, reflux and fecal incontinence.   Genitourinary:  Negative for bladder incontinence, dysuria and hematuria.   Musculoskeletal:  Positive for back pain. Negative for arthralgias and joint swelling.   Integumentary:  Negative for color change and rash.   Allergic/Immunologic: Negative for environmental allergies and food allergies.   Neurological:  Negative for seizures and headaches.   Hematological:  Negative for adenopathy. Does not bruise/bleed easily.        Past Medical History:   Diagnosis Date    BMI 29.0-29.9,adult     Colon polyp     Essential (primary) hypertension     History of hepatitis C 2018    Unspecified cirrhosis of liver     Unspecified osteoarthritis, unspecified site      Past Surgical History:   Procedure Laterality Date    CATARACT EXTRACTION Bilateral     COLONOSCOPY      TOTAL KNEE ARTHROPLASTY Left      Family History   Problem Relation Name Age of Onset    Lung cancer Mother      Lung cancer Father      Diabetes Maternal Grandmother      Esophageal cancer Neg Hx      Inflammatory bowel disease Neg Hx      Irritable bowel syndrome Neg Hx      Rectal cancer Neg Hx      Stomach cancer Neg Hx      Ulcerative colitis Neg Hx       Social History     Tobacco Use    Smoking status: Former     Types: Cigarettes    Smokeless tobacco: Never   Substance Use Topics    Alcohol use: Yes     Comment: very rare    Drug use: Yes     Types: Marijuana     Review of patient's allergies indicates:  No Known Allergies   Medication List with Changes/Refills   Current Medications    LOSARTAN (COZAAR) 50 MG TABLET    Take 50 mg by mouth once daily.          Vital Signs:  /76 (BP Location: Left arm, Patient Position: Sitting, BP Method: Medium (Automatic))   Pulse 74   Resp 16   Ht 6' (1.829 m)   Wt 97 kg (213 lb 12.8 oz)   SpO2 95%   BMI 29.00 kg/m²        Physical Exam  Constitutional:       General: He is not in acute distress.     Appearance: Normal appearance. He is well-developed. He is not ill-appearing or toxic-appearing.   HENT:      Head: Normocephalic and atraumatic.      Nose: Nose normal.      Mouth/Throat:      Mouth: Mucous membranes are moist.      Pharynx: Oropharynx is clear. No oropharyngeal exudate or posterior oropharyngeal erythema.   Eyes:      General: Lids are normal. No scleral icterus.        Right eye: No discharge.         Left eye: No discharge.      Conjunctiva/sclera: Conjunctivae normal.   Cardiovascular:      Rate and Rhythm: Normal rate and regular rhythm.      Pulses:           Radial pulses are 2+ on the right side and 2+ on the left side.   Pulmonary:      Effort: Pulmonary effort is normal. No respiratory distress.      Breath sounds: No stridor. No wheezing.   Abdominal:      General: Bowel sounds are normal. There is no distension.      Palpations: Abdomen is soft. There is no fluid wave, hepatomegaly, splenomegaly or mass.      Tenderness: There is no abdominal tenderness. There is no guarding or rebound.   Musculoskeletal:      Cervical back: Full passive range of motion without pain.   Lymphadenopathy:      Cervical: No cervical adenopathy.   Skin:     General: Skin is warm and dry.      Capillary Refill: Capillary refill takes less than 2 seconds.      Coloration: Skin is not cyanotic, jaundiced or pale.   Neurological:      General: No focal deficit present.      Mental Status: He is alert and oriented to person, place, and time.   Psychiatric:         Mood and Affect: Mood normal.         Behavior: Behavior is cooperative.            All of the data above and below has been reviewed by myself and any  further interpretations will be reflected in the assessment and plan.   The data includes review of external notes, and independent interpretation of lab results, procedures, x-rays, and imaging reports.      Assessment:  Hepatic cirrhosis, unspecified hepatic cirrhosis type, unspecified whether ascites present  -     CBC Auto Differential; Future; Expected date: 09/18/2024  -     Comprehensive Metabolic Panel; Future; Expected date: 09/18/2024  -     Protime-INR; Future; Expected date: 09/18/2024  -     AFP Tumor Marker; Future; Expected date: 09/18/2024  -     US Abdomen Complete; Future; Expected date: 09/18/2024    History of hepatitis C    History of esophageal varices    History of colon polyps         Colon w/adult colonoscope due 8/2026.  Chronic hepatitis C: VL 1.4, genotype 1a, completed Mavyret x12 weeks 7/24/2018, SVR 12 achieved, 2/2020 HCV VL ND, Tx naive beforehand    Due for EGD -varices surveillance.    Recommendations:    Schedule abdominal ultrasound  Submit blood tests.  Proceed with upper endoscopy 1/2025    Risks, benefits, and alternatives of medical management, any associated procedures, and/or treatment discussed with the patient. Patient given opportunity to ask questions and voices understanding. Patient has elected to proceed with the recommended care modalities as discussed.    Follow up Keep appointment with Dr. Rizzo 3/2025.    Order summary:  Orders Placed This Encounter   Procedures    US Abdomen Complete    CBC Auto Differential    Comprehensive Metabolic Panel    Protime-INR    AFP Tumor Marker        Instructed patient to notify my office if they have not been contacted within two weeks after any procedures, submitting any samples (biopsies, blood, stool, urine, etc.) or after any imaging (X-ray, CT, MRI, etc.).      Catarina Díaz NP    This document may have been created using a voice recognition transcribing system. Incorrect words or phrases may have been missed during  proofreading. Please interpret accordingly or contact me for clarification.

## 2024-09-18 ENCOUNTER — TELEPHONE (OUTPATIENT)
Dept: GASTROENTEROLOGY | Facility: CLINIC | Age: 74
End: 2024-09-18

## 2024-09-18 ENCOUNTER — OFFICE VISIT (OUTPATIENT)
Dept: GASTROENTEROLOGY | Facility: CLINIC | Age: 74
End: 2024-09-18
Payer: MEDICARE

## 2024-09-18 VITALS
SYSTOLIC BLOOD PRESSURE: 135 MMHG | HEART RATE: 74 BPM | WEIGHT: 213.81 LBS | RESPIRATION RATE: 16 BRPM | HEIGHT: 72 IN | OXYGEN SATURATION: 95 % | BODY MASS INDEX: 28.96 KG/M2 | DIASTOLIC BLOOD PRESSURE: 76 MMHG

## 2024-09-18 DIAGNOSIS — K74.60 HEPATIC CIRRHOSIS, UNSPECIFIED HEPATIC CIRRHOSIS TYPE, UNSPECIFIED WHETHER ASCITES PRESENT: Primary | ICD-10-CM

## 2024-09-18 DIAGNOSIS — Z86.010 HISTORY OF COLON POLYPS: ICD-10-CM

## 2024-09-18 DIAGNOSIS — Z87.19 HISTORY OF ESOPHAGEAL VARICES: ICD-10-CM

## 2024-09-18 DIAGNOSIS — Z86.19 HISTORY OF HEPATITIS C: ICD-10-CM

## 2024-09-18 PROCEDURE — 99213 OFFICE O/P EST LOW 20 MIN: CPT | Mod: S$GLB,,, | Performed by: NURSE PRACTITIONER

## 2024-09-18 NOTE — PATIENT INSTRUCTIONS
Schedule abdominal ultrasound  Submit blood tests.  Proceed with upper endoscopy 1/2025    Please notify my office if you have not been contacted within two weeks after any procedures, submitting any samples (biopsies, blood, stool, urine, etc.) or after any imaging (X-ray, CT, MRI, etc.).

## 2024-09-24 ENCOUNTER — TELEPHONE (OUTPATIENT)
Dept: GASTROENTEROLOGY | Facility: CLINIC | Age: 74
End: 2024-09-24
Payer: MEDICARE

## 2024-09-24 NOTE — TELEPHONE ENCOUNTER
9/23/2024 Hgb 13.9L MCV 90, K 5.3H, CBC, CMP, AFP-nl    Call with results. Let patient know his bloodwork overall looked well. Liver enzymes were normal and tumor marker was negative. Will await is US  kN

## 2024-09-25 NOTE — TELEPHONE ENCOUNTER
Patient was notified of all results and voiced understanding. He will be continuing with planned EGD 1/8/2025. DMP

## 2024-09-25 NOTE — TELEPHONE ENCOUNTER
9/25/2024 ABD US: Cirrhotic liver, GB wall thickening as can be seen with congestive cholecystopathy, Distended vessels or fluid filled bowel L hepatic lobe. Patent PV    Can also let patient know that his US looked well. His liver was without lesions. GB was still distended as it has been on last US. Recommend continuing ith EGD as planned.  ESTEBAN

## 2024-12-18 ENCOUNTER — TELEPHONE (OUTPATIENT)
Dept: GASTROENTEROLOGY | Facility: CLINIC | Age: 74
End: 2024-12-18
Payer: MEDICARE

## 2024-12-18 VITALS — HEIGHT: 72 IN | BODY MASS INDEX: 28.85 KG/M2 | WEIGHT: 213 LBS

## 2024-12-18 DIAGNOSIS — K74.60 HEPATIC CIRRHOSIS, UNSPECIFIED HEPATIC CIRRHOSIS TYPE, UNSPECIFIED WHETHER ASCITES PRESENT: Primary | ICD-10-CM

## 2024-12-18 DIAGNOSIS — Z87.19 HISTORY OF ESOPHAGEAL VARICES: ICD-10-CM

## 2024-12-18 DIAGNOSIS — Z87.11 HISTORY OF PEPTIC ULCER DISEASE: ICD-10-CM

## 2024-12-18 NOTE — TELEPHONE ENCOUNTER
S/w pt and told him that I was calling as a courtesy regarding up coming EGD with NBP on 1/8/25, Wed and wanted to verify that he has his paper prep instructions . Pt stated he will need to reschedule because he's getting ready to leave town since daughter is have grand baby. I r/s to 5/15/25. torres

## 2024-12-18 NOTE — TELEPHONE ENCOUNTER
"[facility]  [address] [place] [address]  Phone: [phone]  Fax: [phone]    History & Physical         Provider: Dr. Charisse Rizzo    Patient Name: Maciej Spence DOB (age):1950  74 y.o.           Gender: male   Phone: [phone]     Referring Physician: No, Primary Doctor     Vital Signs:   Height - 6' 0"  Weight - 213 lb  BMI -  28.89    Plan: EGD @ [facility]    Encounter Diagnoses   Name Primary?    Hepatic cirrhosis, unspecified hepatic cirrhosis type, unspecified whether ascites present Yes    History of peptic ulcer disease     History of esophageal varices            History:      Past Medical History:   Diagnosis Date    BMI 29.0-29.9,adult     Colon polyp     Essential (primary) hypertension     History of hepatitis C 2018    Unspecified cirrhosis of liver     Unspecified osteoarthritis, unspecified site       Past Surgical History:   Procedure Laterality Date    CATARACT EXTRACTION Bilateral     COLONOSCOPY      TOTAL KNEE ARTHROPLASTY Left       Medication List with Changes/Refills   Current Medications    LOSARTAN (COZAAR) 50 MG TABLET    Take 50 mg by mouth once daily.      Review of patient's allergies indicates:  No Known Allergies   Family History   Problem Relation Name Age of Onset    Lung cancer Mother      Lung cancer Father      Diabetes Maternal Grandmother      Esophageal cancer Neg Hx      Inflammatory bowel disease Neg Hx      Irritable bowel syndrome Neg Hx      Rectal cancer Neg Hx      Stomach cancer Neg Hx      Ulcerative colitis Neg Hx        Social History     Tobacco Use    Smoking status: Former     Types: Cigarettes    Smokeless tobacco: Never   Substance Use Topics    Alcohol use: Yes     Comment: very rare    Drug use: Yes     Types: Marijuana        Physical Examination:     General Appearance:___________________________  HEENT: " _____________________________________  Abdomen:____________________________________  Heart:________________________________________  Lungs:_______________________________________  Extremities:___________________________________  Skin:_________________________________________  Endocrine:____________________________________  Genitourinary:_________________________________  Neurological:__________________________________      Patient has been evaluated immediately prior to sedation and is medically cleared for endoscopy with IVCS as an ASA class: ______      Physician Signature: _________________________       Date: ________  Time: ________

## 2025-03-18 ENCOUNTER — TELEPHONE (OUTPATIENT)
Dept: GASTROENTEROLOGY | Facility: CLINIC | Age: 75
End: 2025-03-18

## 2025-03-18 ENCOUNTER — OFFICE VISIT (OUTPATIENT)
Dept: GASTROENTEROLOGY | Facility: CLINIC | Age: 75
End: 2025-03-18
Payer: MEDICARE

## 2025-03-18 VITALS
SYSTOLIC BLOOD PRESSURE: 155 MMHG | WEIGHT: 225.19 LBS | DIASTOLIC BLOOD PRESSURE: 83 MMHG | BODY MASS INDEX: 30.5 KG/M2 | HEIGHT: 72 IN | HEART RATE: 72 BPM | OXYGEN SATURATION: 98 %

## 2025-03-18 DIAGNOSIS — Z87.19 HISTORY OF ESOPHAGEAL VARICES: ICD-10-CM

## 2025-03-18 DIAGNOSIS — Z87.11 HISTORY OF PEPTIC ULCER DISEASE: ICD-10-CM

## 2025-03-18 DIAGNOSIS — Z86.19 HISTORY OF HEPATITIS C: ICD-10-CM

## 2025-03-18 DIAGNOSIS — K74.60 HEPATIC CIRRHOSIS, UNSPECIFIED HEPATIC CIRRHOSIS TYPE, UNSPECIFIED WHETHER ASCITES PRESENT: Primary | ICD-10-CM

## 2025-03-18 DIAGNOSIS — Z86.0100 HISTORY OF COLON POLYPS: ICD-10-CM

## 2025-03-18 LAB
ABS NRBC COUNT: 0 X 10 3/UL (ref 0–0.01)
ABSOLUTE BASOPHIL: 0.03 X 10 3/UL (ref 0–0.22)
ABSOLUTE EOSINOPHIL: 0.12 X 10 3/UL (ref 0.04–0.54)
ABSOLUTE IMMATURE GRAN: 0.01 X 10 3/UL (ref 0–0.04)
ABSOLUTE LYMPHOCYTE: 1.87 X 10 3/UL (ref 0.86–4.75)
ABSOLUTE MONOCYTE: 0.45 X 10 3/UL (ref 0.22–1.08)
ADDITIONAL TESTING: NORMAL
ALBUMIN SERPL-MCNC: 4.6 G/DL (ref 3.5–5.2)
ALBUMIN/GLOB SERPL ELPH: 1.6 {RATIO} (ref 1–2.7)
ALP ISOS SERPL LEV INH-CCNC: 62 U/L (ref 40–130)
ALPHA FETOPROTEIN MATERNAL: 3.14 NG/ML (ref 0–8.3)
ALT (SGPT): 17 U/L (ref 0–41)
ANION GAP SERPL CALC-SCNC: 9 MMOL/L (ref 8–17)
AST SERPL-CCNC: 23 U/L (ref 0–40)
BASOPHILS NFR BLD: 0.5 % (ref 0.2–1.2)
BILIRUBIN, TOTAL: 0.64 MG/DL (ref 0–1.2)
BUN/CREAT SERPL: 17.7 (ref 6–20)
CALCIUM SERPL-MCNC: 10.2 MG/DL (ref 8.6–10.2)
CARBON DIOXIDE, CO2: 27 MMOL/L (ref 22–29)
CHLORIDE: 106 MMOL/L (ref 98–107)
CREAT SERPL-MCNC: 0.77 MG/DL (ref 0.7–1.2)
EOSINOPHIL NFR BLD: 2.2 % (ref 0.7–7)
GFR ESTIMATION: 93.95 ML/MIN/1.73M2
GLOBULIN: 2.9 G/DL (ref 1.5–4.5)
GLUCOSE: 97 MG/DL (ref 82–115)
HCT VFR BLD AUTO: 41.5 % (ref 42–52)
HGB BLD-MCNC: 14 G/DL (ref 14–18)
IMMATURE GRANULOCYTES: 0.2 % (ref 0–0.5)
LYMPHOCYTES NFR BLD: 33.8 % (ref 19.3–53.1)
MCH RBC QN AUTO: 30.9 PG (ref 27–32)
MCHC RBC AUTO-ENTMCNC: 33.7 G/DL (ref 32–36)
MCV RBC AUTO: 91.6 FL (ref 80–94)
MONOCYTES NFR BLD: 8.1 % (ref 4.7–12.5)
NEUTROPHILS # BLD AUTO: 3.06 X 10 3/UL (ref 2.15–7.56)
NEUTROPHILS NFR BLD: 55.2 % (ref 34–71.1)
NUCLEATED RED BLOOD CELLS: 0 /100 WBC (ref 0–0.2)
PLATELET # BLD AUTO: 209 X 10 3/UL (ref 135–400)
POTASSIUM: 5.2 MMOL/L (ref 3.5–5.1)
PROT SNV-MCNC: 7.5 G/DL (ref 6.4–8.3)
RBC # BLD AUTO: 4.53 X 10 6/UL (ref 4.7–6.1)
RDW-SD: 42.7 FL (ref 37–54)
SODIUM: 142 MMOL/L (ref 136–145)
UREA NITROGEN (BUN): 13.6 MG/DL (ref 8–23)
WBC # BLD: 5.54 X 10 3/UL (ref 4.3–10.8)

## 2025-03-18 PROCEDURE — 99215 OFFICE O/P EST HI 40 MIN: CPT | Mod: S$PBB,,, | Performed by: INTERNAL MEDICINE

## 2025-03-18 NOTE — PATIENT INSTRUCTIONS
Submit blood test.  Schedule abdominal ultrasound.    If any tests, procedures, or imaging has been ordered and you are not contacted to schedule within 1-2 weeks, then you may call the central scheduling department directly at [phone].   Please notify my office if you have not been contacted within two weeks after any procedures, submitting any samples (biopsies, blood, stool, urine, etc.) or after any imaging (X-ray, CT, MRI, etc.).

## 2025-03-18 NOTE — PROGRESS NOTES
Clinic Note    Reason for visit:  The primary encounter diagnosis was Hepatic cirrhosis, unspecified hepatic cirrhosis type, unspecified whether ascites present. Diagnoses of History of peptic ulcer disease, History of esophageal varices, History of hepatitis C, and History of colon polyps were also pertinent to this visit.    PCP: Rosalinda, Primary Doctor       HPI:  This is a 75 y.o. male here for follow up. Patient with cirrhosis. Has EGD scheduled 5/15/2025 for variceal screening but he states he is not going to have that done because he does not have any issues. No heartburn or dysphagia. No blood in stool.       9/23/2024 Hgb 13.9L MCV 90, K 5.3H, CBC, CMP, AFP-nl   3/26/2024 Hb 13.7L, MCV 91, CBC onl. K 5.2H, CMP onl. INR/AFP nl.      9/25/2024 ABD US: Cirrhotic liver, GB wall thickening as can be seen with congestive cholecystopathy, Distended vessels or fluid filled bowel L hepatic lobe. Patent PV   4/2024 Abd US: nodular heterog liver, no masses, PV patent, GB wall 6mm, onl.   8/2023 Abd US: coarse liver, nodular, mild GB wall thickening, perhaps mild sludge in GB, no hepatomas, PV patent.      Colonoscopy 8/24/2023: Long redundant colon. 5 TA, repeat colonoscopy with adult colonoscope in 3 years.   EGD 5/6/2021: GBx nl w/o Hp      No PCP     Abnormality of alphafetoprotein: CT without hepatomas  Admits alcohol use: stopped  Chronic hepatitis C: VL 1.4, genotype 1a, completed Mavyret x12 weeks 7/24/2018, SVR 12 achieved, 2/2020 HCV VL ND, Tx naive beforehand  Gastric ulcer: he will get it from [place], taking PRN based on Sx panto 20mg PO daily  Unspecified cirrhosis of liver: signs of coagulopathy/thrombocytopenia, nodular liver on US and CT, no liver tumors on CT, AFP mildly elevated, no edema/ascites, no EV on 8/2017 EGD, 7/2018 EGD with small EV --> 9/2019 w/o varices. Blood/US every 6 months. OV every year but he is to call me if any issues.  Screening for malignant neoplasms of colon: high risk  INR raised:  minimal, due to cirrhosis    Review of Systems   Constitutional:  Negative for diaphoresis, fatigue, fever and unexpected weight change.   HENT:  Negative for hearing loss, mouth sores, postnasal drip, sore throat and trouble swallowing.    Eyes:  Positive for eye dryness. Negative for pain and discharge.   Respiratory:  Negative for apnea, cough, choking, chest tightness, shortness of breath and wheezing.    Cardiovascular:  Negative for chest pain, palpitations and leg swelling.   Gastrointestinal:  Negative for abdominal distention, abdominal pain, anal bleeding, blood in stool, change in bowel habit, constipation, diarrhea, nausea, rectal pain, vomiting, reflux and fecal incontinence.   Genitourinary:  Negative for bladder incontinence, dysuria and hematuria.   Musculoskeletal:  Negative for arthralgias, back pain and joint swelling.   Integumentary:  Negative for color change and rash.   Allergic/Immunologic: Negative for environmental allergies and food allergies.   Neurological:  Negative for seizures and headaches.   Hematological:  Negative for adenopathy. Does not bruise/bleed easily.        Past Medical History:   Diagnosis Date    BMI 30.0-30.9,adult     Colon polyp     Essential (primary) hypertension     History of hepatitis C 2018    Unspecified cirrhosis of liver     Unspecified osteoarthritis, unspecified site      Past Surgical History:   Procedure Laterality Date    CATARACT EXTRACTION Bilateral     COLONOSCOPY      TOTAL KNEE ARTHROPLASTY Left      Family History   Problem Relation Name Age of Onset    Lung cancer Mother      Lung cancer Father      Diabetes Maternal Grandmother      Esophageal cancer Neg Hx      Inflammatory bowel disease Neg Hx      Irritable bowel syndrome Neg Hx      Rectal cancer Neg Hx      Stomach cancer Neg Hx      Ulcerative colitis Neg Hx       Social History[1]  Review of patient's allergies indicates:  No Known Allergies     Medication List with Changes/Refills    Current Medications    LOSARTAN (COZAAR) 50 MG TABLET    Take 50 mg by mouth once daily.         Vital Signs:  BP (!) 155/83 (BP Location: Left arm, Patient Position: Sitting)   Pulse 72   Ht 6' (1.829 m)   Wt 102.2 kg (225 lb 3.2 oz)   SpO2 98%   BMI 30.54 kg/m²         Physical Exam  Constitutional:       General: He is not in acute distress.     Appearance: Normal appearance. He is well-developed. He is not ill-appearing or toxic-appearing.   HENT:      Head: Normocephalic and atraumatic.      Nose: Nose normal.      Mouth/Throat:      Mouth: Mucous membranes are moist.      Pharynx: Oropharynx is clear. No oropharyngeal exudate or posterior oropharyngeal erythema.   Eyes:      General: Lids are normal. No scleral icterus.        Right eye: No discharge.         Left eye: No discharge.      Conjunctiva/sclera: Conjunctivae normal.   Cardiovascular:      Rate and Rhythm: Normal rate and regular rhythm.      Pulses:           Radial pulses are 2+ on the right side and 2+ on the left side.   Pulmonary:      Effort: Pulmonary effort is normal. No respiratory distress.      Breath sounds: No stridor. No wheezing.   Abdominal:      General: Bowel sounds are normal. There is no distension.      Palpations: Abdomen is soft. There is no fluid wave, hepatomegaly, splenomegaly or mass.      Tenderness: There is no abdominal tenderness. There is no guarding or rebound.   Lymphadenopathy:      Cervical: No cervical adenopathy.   Skin:     General: Skin is warm and dry.      Capillary Refill: Capillary refill takes less than 2 seconds.      Coloration: Skin is not cyanotic, jaundiced or pale.   Neurological:      General: No focal deficit present.      Mental Status: He is alert and oriented to person, place, and time.   Psychiatric:         Mood and Affect: Mood normal.         Behavior: Behavior is cooperative.            All of the data above and below has been reviewed by myself and any further interpretations will  be reflected in the assessment and plan.   The data includes review of external notes, and independent interpretation of lab results, procedures, x-rays, and imaging reports.      Assessment:  Hepatic cirrhosis, unspecified hepatic cirrhosis type, unspecified whether ascites present  -     AFP Tumor Marker; Future; Expected date: 03/18/2025  -     CBC Auto Differential; Future; Expected date: 03/18/2025  -     Comprehensive Metabolic Panel; Future; Expected date: 03/18/2025  -     Protime-INR; Future; Expected date: 03/18/2025  -     US Abdomen Complete; Future; Expected date: 03/18/2025    History of peptic ulcer disease    History of esophageal varices    History of hepatitis C    History of colon polyps      Colon w/adult colonoscope due 8/2026.  Chronic hepatitis C: VL 1.4, genotype 1a, completed Mavyret x12 weeks 7/24/2018, SVR 12 achieved, 2/2020 HCV VL ND, Tx naive beforehand.   Cirrhosis, US/labs every 6 months. He would like to get his labs/US prior to OVs in the future. Will reorder labs and US after we get next results.     Due for EGD -varices surveillance. Scheduled for 5/15/2025 but he declines procedure. Understands it is for variceal surveillance.    Recommendations:    Submit blood test.  Schedule abdominal ultrasound.      If any tests, procedures, or imaging has been ordered and you are not contacted to schedule within 1-2 weeks, then you may call the central scheduling department directly at [phone].     Risks, benefits, and alternatives of medical management, any associated procedures, and/or treatment discussed with the patient. Patient given opportunity to ask questions and voices understanding. Patient has elected to proceed with the recommended care modalities as discussed.    Instructed patient to notify my office if they have not been contacted within two weeks after any procedures, submitting any samples (biopsies, blood, stool, urine, etc.) or after any imaging (X-ray, CT, MRI,  etc.).     Follow up in about 6 months (around 9/18/2025).    Order summary:  Orders Placed This Encounter   Procedures    US Abdomen Complete    AFP Tumor Marker    CBC Auto Differential    Comprehensive Metabolic Panel    Protime-INR      This assessment, plan, and documentation was performed in collaboration with Angeline Ybarra NP.     This document may have been created using a voice recognition transcribing system. Incorrect words or phrases may have been missed during proofreading. Please interpret accordingly or contact me for clarification.     Charisse Rizzo MD         [1]   Social History  Tobacco Use    Smoking status: Former     Types: Cigarettes    Smokeless tobacco: Never   Substance Use Topics    Alcohol use: Yes     Comment: very rare    Drug use: Yes     Types: Marijuana

## 2025-03-18 NOTE — TELEPHONE ENCOUNTER
Faxed US to [facility], no PA needed. I also reprinted labs from 10/20/24 and give them to pt again. torres

## 2025-03-19 ENCOUNTER — RESULTS FOLLOW-UP (OUTPATIENT)
Dept: GASTROENTEROLOGY | Facility: CLINIC | Age: 75
End: 2025-03-19
Payer: MEDICARE

## 2025-03-20 NOTE — TELEPHONE ENCOUNTER
CBC/CMP/INR/AFP nl. At last OV patient agreed to EGD with his next colonoscopy and understood/accepted the risk of variceal complications with that delay.    Notify patient that his blood results look well including his kidneys, liver and blood counts. He should notify us if he does not hear from us with his US results within two weeks of completing it.  NBP

## 2025-04-13 ENCOUNTER — RESULTS FOLLOW-UP (OUTPATIENT)
Dept: GASTROENTEROLOGY | Facility: CLINIC | Age: 75
End: 2025-04-13
Payer: MEDICARE

## 2025-04-13 NOTE — TELEPHONE ENCOUNTER
4/2/2025 Abd US: coarse liver, PV patent, nodular liver, 3mm GB wall, no lazarus dil, possible small accessory spleen.    Notify patient that his liver US looked well. He has this baseline cirrhosis changes but no liver tumors.  NBP